# Patient Record
Sex: FEMALE
[De-identification: names, ages, dates, MRNs, and addresses within clinical notes are randomized per-mention and may not be internally consistent; named-entity substitution may affect disease eponyms.]

---

## 2024-07-31 ENCOUNTER — APPOINTMENT (OUTPATIENT)
Dept: DERMATOLOGY | Facility: CLINIC | Age: 34
End: 2024-07-31
Payer: COMMERCIAL

## 2024-07-31 DIAGNOSIS — L65.9 NONSCARRING HAIR LOSS, UNSPECIFIED: ICD-10-CM

## 2024-07-31 DIAGNOSIS — L64.9 ANDROGENIC ALOPECIA, UNSPECIFIED: ICD-10-CM

## 2024-07-31 PROBLEM — Z00.00 ENCOUNTER FOR PREVENTIVE HEALTH EXAMINATION: Status: ACTIVE | Noted: 2024-07-31

## 2024-07-31 PROCEDURE — 99204 OFFICE O/P NEW MOD 45 MIN: CPT

## 2024-07-31 RX ORDER — SPIRONOLACTONE 50 MG/1
50 TABLET ORAL
Qty: 1 | Refills: 5 | Status: ACTIVE | COMMUNITY
Start: 2024-07-31 | End: 1900-01-01

## 2024-07-31 NOTE — ASSESSMENT
[FreeTextEntry1] : #Nonscarring alopecia, favor AGA Discussed with the patient possible underlying etiologies including androgenetic alopecia, telogen effluvium, diffuse alopecia areata, medication induced, and systemic underlying causes such as iron deficiency, thyroid disease, and vitamin deficiencies.  Occasionally a biopsy may be helpful in establishing a diagnosis.  We discussed that topical minoxidil for at least 6 months can be helpful although any regrowth attained may be lost after discontinuation.   - Will plan to check CBC, TSH w/reflex T4, free and total testosterone, DHEA-S, RAVINDER, Ferritin, Vitamin D.  Since she had bloodwork done and does not want to repeat if possible, she will email us her report to [email or web address]. I will plan to review once it was received - Restart topical minoxidil 5% b.i.d. x 6 months. Verbal and written instructions on proper use and common side effects were given to the patient. - Start spironolactone 50 mg QD for 2 weeks, increase to 100mg QD as tolerated. Discussed side effects of GI upset, headache, menstrual irregularities, breast tenderness, rare hyperkalemia.  - can start oral minoxidil if improvement is insufficient with above plan. Risk and benefits were discussed with her today. she is in agreement w/ plan  RTC 6 months or soon prn

## 2024-07-31 NOTE — HISTORY OF PRESENT ILLNESS
[FreeTextEntry1] : NPV- hair loss [de-identified] : La Learner is a 35 y/o F patient came today for hair loss and eyes bags.  - Experiencing for 5 years on front hairline, temples, arms, and legs - No scalp irritation, flaking, itching, or burning  - Slow and progressive  - No medical conditions, dietary restrictions, or weight loss surgery - Has tried topical minoxidil (x 2 mo, stopped bc she started shedding) and oral Biotin. interested in spironolactone and oral minoxidil - Mother has hx of hair thinning , started around this time.  Reports having extensive bloodwork done recently for egg donation. Does not have results.   PMH -no history of skin cancer  FHx  -No family history of melanoma

## 2024-07-31 NOTE — PHYSICAL EXAM
Patient given Rx for glasses. [FreeTextEntry3] : Mild diffuse thinning is noted on the  vertex scalp with preservation of hair on the occipital scalp.   Miniaturized hairs + Hair part width increased + Hair ostia are preserved without features of scarring- + Perifollicular erythema, pustules, or scaling present - no

## 2024-11-11 ENCOUNTER — TRANSCRIPTION ENCOUNTER (OUTPATIENT)
Age: 34
End: 2024-11-11

## 2024-12-10 ENCOUNTER — NON-APPOINTMENT (OUTPATIENT)
Age: 34
End: 2024-12-10

## 2025-02-07 ENCOUNTER — APPOINTMENT (OUTPATIENT)
Dept: DERMATOLOGY | Facility: CLINIC | Age: 35
End: 2025-02-07
Payer: COMMERCIAL

## 2025-02-07 VITALS — HEART RATE: 71 BPM | SYSTOLIC BLOOD PRESSURE: 96 MMHG | DIASTOLIC BLOOD PRESSURE: 77 MMHG

## 2025-02-07 DIAGNOSIS — L64.9 ANDROGENIC ALOPECIA, UNSPECIFIED: ICD-10-CM

## 2025-02-07 DIAGNOSIS — L65.9 NONSCARRING HAIR LOSS, UNSPECIFIED: ICD-10-CM

## 2025-02-07 PROCEDURE — 99214 OFFICE O/P EST MOD 30 MIN: CPT

## 2025-02-07 RX ORDER — MINOXIDIL 2.5 MG/1
2.5 TABLET ORAL
Qty: 7 | Refills: 2 | Status: ACTIVE | COMMUNITY
Start: 2025-02-07 | End: 1900-01-01

## 2025-06-06 ENCOUNTER — APPOINTMENT (OUTPATIENT)
Dept: DERMATOLOGY | Facility: CLINIC | Age: 35
End: 2025-06-06